# Patient Record
Sex: FEMALE | Race: BLACK OR AFRICAN AMERICAN | NOT HISPANIC OR LATINO | Employment: FULL TIME | URBAN - METROPOLITAN AREA
[De-identification: names, ages, dates, MRNs, and addresses within clinical notes are randomized per-mention and may not be internally consistent; named-entity substitution may affect disease eponyms.]

---

## 2023-02-24 LAB
FOLATE (NG/ML) IN SER/PLAS: 8.1 NG/ML
HEMOGLOBIN (PG) IN RETICULOCYTES: 24 PG (ref 28–38)
IMMATURE RETIC FRACTION: 30.1 % (ref 0–16)
IRON (UG/DL) IN SER/PLAS: 16 UG/DL (ref 35–150)
IRON BINDING CAPACITY (UG/DL) IN SER/PLAS: 466 UG/DL (ref 240–445)
IRON SATURATION (%) IN SER/PLAS: 3 % (ref 25–45)
RETICULOCYTES (10*3/UL) IN BLOOD: 0.07 X10E12/L (ref 0.02–0.08)
RETICULOCYTES/100 ERYTHROCYTES IN BLOOD BY AUTOMATED COUNT: 1.7 % (ref 0.5–2)

## 2023-02-27 LAB — FERRITIN (UG/LL) IN SER/PLAS: 9 UG/L (ref 8–150)

## 2023-03-03 LAB — SARS-COV-2 RESULT: NOT DETECTED

## 2023-03-22 LAB
CHLAMYDIA TRACH., AMPLIFIED: NEGATIVE
CLUE CELLS: PRESENT
N. GONORRHEA, AMPLIFIED: NEGATIVE
NUGENT SCORE: 7
TRICHOMONAS VAGINALIS: NEGATIVE
YEAST: ABNORMAL

## 2023-12-01 ENCOUNTER — APPOINTMENT (OUTPATIENT)
Dept: BEHAVIORAL HEALTH | Facility: CLINIC | Age: 23
End: 2023-12-01
Payer: COMMERCIAL

## 2023-12-07 ENCOUNTER — OFFICE VISIT (OUTPATIENT)
Dept: BEHAVIORAL HEALTH | Facility: CLINIC | Age: 23
End: 2023-12-07
Payer: COMMERCIAL

## 2023-12-07 DIAGNOSIS — F33.1 MODERATE EPISODE OF RECURRENT MAJOR DEPRESSIVE DISORDER (MULTI): ICD-10-CM

## 2023-12-07 PROBLEM — G89.29 CHRONIC BILATERAL THORACIC BACK PAIN: Status: ACTIVE | Noted: 2023-12-07

## 2023-12-07 PROBLEM — N76.1 SUBACUTE AND CHRONIC VAGINITIS: Status: ACTIVE | Noted: 2023-12-07

## 2023-12-07 PROBLEM — J00 COMMON COLD: Status: ACTIVE | Noted: 2023-12-07

## 2023-12-07 PROBLEM — M54.6 CHRONIC BILATERAL THORACIC BACK PAIN: Status: ACTIVE | Noted: 2023-12-07

## 2023-12-07 PROBLEM — J45.909 ASTHMA (HHS-HCC): Status: ACTIVE | Noted: 2022-07-15

## 2023-12-07 PROBLEM — J40 BRONCHITIS: Status: ACTIVE | Noted: 2023-12-07

## 2023-12-07 PROBLEM — B37.31 VULVOVAGINITIS DUE TO YEAST: Status: ACTIVE | Noted: 2022-07-15

## 2023-12-07 PROBLEM — E66.9 OBESITY, CLASS I, BMI 30-34.9: Status: ACTIVE | Noted: 2023-12-07

## 2023-12-07 PROBLEM — J02.9 ACUTE PHARYNGITIS: Status: ACTIVE | Noted: 2023-12-07

## 2023-12-07 PROBLEM — J35.01 CHRONIC TONSILLITIS: Status: ACTIVE | Noted: 2018-07-27

## 2023-12-07 PROBLEM — B95.1 GROUP B STREPTOCOCCUS URINARY TRACT INFECTION AFFECTING PREGNANCY IN FIRST TRIMESTER (HHS-HCC): Status: ACTIVE | Noted: 2022-07-18

## 2023-12-07 PROBLEM — J02.9 SORE THROAT: Status: ACTIVE | Noted: 2023-12-07

## 2023-12-07 PROBLEM — O23.41 GROUP B STREPTOCOCCUS URINARY TRACT INFECTION AFFECTING PREGNANCY IN FIRST TRIMESTER (HHS-HCC): Status: ACTIVE | Noted: 2022-07-18

## 2023-12-07 PROCEDURE — 99205 OFFICE O/P NEW HI 60 MIN: CPT | Performed by: CLINICAL NURSE SPECIALIST

## 2023-12-07 RX ORDER — METRONIDAZOLE 500 MG/1
500 TABLET ORAL 2 TIMES DAILY
COMMUNITY
Start: 2023-03-22

## 2023-12-07 RX ORDER — ALBUTEROL SULFATE 90 UG/1
AEROSOL, METERED RESPIRATORY (INHALATION) 4 TIMES DAILY
COMMUNITY
Start: 2019-11-04

## 2023-12-07 RX ORDER — FLUTICASONE PROPIONATE 50 MCG
2 SPRAY, SUSPENSION (ML) NASAL DAILY
COMMUNITY
Start: 2014-05-29

## 2023-12-07 RX ORDER — POLYETHYLENE GLYCOL 3350 17 G/17G
POWDER, FOR SOLUTION ORAL
COMMUNITY
Start: 2023-03-07

## 2023-12-07 RX ORDER — IBUPROFEN 600 MG/1
600 TABLET ORAL EVERY 6 HOURS
COMMUNITY
Start: 2023-03-07

## 2023-12-07 RX ORDER — FLUTICASONE PROPIONATE 50 MCG
1 SPRAY, SUSPENSION (ML) NASAL
COMMUNITY
Start: 2022-04-25

## 2023-12-07 RX ORDER — ALBUTEROL SULFATE 90 UG/1
2 AEROSOL, METERED RESPIRATORY (INHALATION) EVERY 4 HOURS PRN
COMMUNITY
Start: 2022-09-01

## 2023-12-07 RX ORDER — DOCUSATE SODIUM 100 MG/1
CAPSULE, LIQUID FILLED ORAL
COMMUNITY
Start: 2022-12-19

## 2023-12-07 RX ORDER — FERROUS SULFATE 325(65) MG
1 TABLET ORAL DAILY
COMMUNITY
Start: 2023-01-16

## 2023-12-07 RX ORDER — TERCONAZOLE 8 MG/G
CREAM VAGINAL
COMMUNITY

## 2023-12-07 RX ORDER — PYRIDOXINE HCL (VITAMIN B6) 25 MG
1 TABLET ORAL 3 TIMES DAILY
COMMUNITY
Start: 2022-07-20

## 2023-12-07 RX ORDER — NAPROXEN SODIUM 220 MG/1
2 TABLET, FILM COATED ORAL DAILY
COMMUNITY
Start: 2022-10-24

## 2023-12-07 RX ORDER — PRENATAL VIT NO.126/IRON/FOLIC 28MG-0.8MG
1 TABLET ORAL DAILY
COMMUNITY
Start: 2022-10-24

## 2023-12-07 RX ORDER — ASPIRIN 325 MG
TABLET, DELAYED RELEASE (ENTERIC COATED) ORAL
COMMUNITY
Start: 2022-07-19

## 2023-12-07 RX ORDER — AZITHROMYCIN 250 MG/1
TABLET, FILM COATED ORAL
COMMUNITY
Start: 2019-11-04

## 2023-12-07 RX ORDER — ACETAMINOPHEN 325 MG/1
975 TABLET ORAL EVERY 6 HOURS
COMMUNITY
Start: 2023-03-07

## 2023-12-07 RX ORDER — ONDANSETRON 4 MG/1
TABLET, ORALLY DISINTEGRATING ORAL
COMMUNITY
Start: 2014-12-27

## 2023-12-07 RX ORDER — LORATADINE 10 MG/1
1 TABLET ORAL
COMMUNITY
Start: 2022-04-25

## 2023-12-07 RX ORDER — FLUTICASONE PROPIONATE 220 UG/1
2 AEROSOL, METERED RESPIRATORY (INHALATION) 2 TIMES DAILY
COMMUNITY
Start: 2022-09-27

## 2023-12-07 RX ORDER — CEPHALEXIN 500 MG/1
CAPSULE ORAL
COMMUNITY
Start: 2023-02-10
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
## 2023-12-08 VITALS
BODY MASS INDEX: 35.4 KG/M2 | HEIGHT: 71 IN | RESPIRATION RATE: 18 BRPM | DIASTOLIC BLOOD PRESSURE: 75 MMHG | TEMPERATURE: 98.1 F | SYSTOLIC BLOOD PRESSURE: 116 MMHG | HEART RATE: 69 BPM | WEIGHT: 252.87 LBS